# Patient Record
Sex: MALE | Race: WHITE | NOT HISPANIC OR LATINO | Employment: STUDENT | URBAN - METROPOLITAN AREA
[De-identification: names, ages, dates, MRNs, and addresses within clinical notes are randomized per-mention and may not be internally consistent; named-entity substitution may affect disease eponyms.]

---

## 2022-09-06 ENCOUNTER — APPOINTMENT (OUTPATIENT)
Dept: LAB | Facility: CLINIC | Age: 22
End: 2022-09-06
Payer: COMMERCIAL

## 2022-09-06 DIAGNOSIS — Z13.0 ENCOUNTER FOR SICKLE-CELL SCREENING: ICD-10-CM

## 2022-09-06 PROCEDURE — 85660 RBC SICKLE CELL TEST: CPT

## 2022-09-06 PROCEDURE — 36415 COLL VENOUS BLD VENIPUNCTURE: CPT

## 2022-09-07 LAB — SICKLE CELLS BLD QL SMEAR: NEGATIVE

## 2022-09-08 ENCOUNTER — ATHLETIC TRAINING (OUTPATIENT)
Dept: SPORTS MEDICINE | Facility: OTHER | Age: 22
End: 2022-09-08

## 2022-09-08 DIAGNOSIS — M25.551 RIGHT HIP PAIN: Primary | ICD-10-CM

## 2022-09-08 NOTE — PROGRESS NOTES
AT Evaluation    Assessment/Plan     Patient will be referred to [facility] for further evaluation  Subjective     Patient reported to  for R lower back pain  Patient states the pain had started March 2022  Ed Vargas Patient stated throughout the last baseball season, the pain was intermittent, and did not report symptoms to   At this time, patient is experiencing pain while sitting in class/sitting in truck for long periods of time  Pain is described as a sharp pain located along R portion of lower back and into R hip  Patient has no problems walking/standing/laying down on back, but as soon as patient sits down, pain begins again  Patient also reports numbness and tingling down R leg after sitting for long periods of time  Patient describes pain level as 6/10 at worst when sitting  Patient also reports a clicking/popping sensation in R hip at this time  Objective     Upon inspection, Patient had no swelling, edema, or obvious deformities at R lower back and R hip   ROM WNL, but patient experiences pain with hip flexion      (+) hip scour   (-) slump test  (-) SLR & (-) SLR w/ dorsiflexion

## 2022-09-09 ENCOUNTER — APPOINTMENT (OUTPATIENT)
Dept: RADIOLOGY | Facility: OTHER | Age: 22
End: 2022-09-09
Payer: COMMERCIAL

## 2022-09-09 ENCOUNTER — ATHLETIC TRAINING (OUTPATIENT)
Dept: SPORTS MEDICINE | Facility: OTHER | Age: 22
End: 2022-09-09

## 2022-09-09 ENCOUNTER — APPOINTMENT (OUTPATIENT)
Dept: RADIOLOGY | Facility: OTHER | Age: 22
End: 2022-09-09

## 2022-09-09 VITALS
SYSTOLIC BLOOD PRESSURE: 110 MMHG | WEIGHT: 215.6 LBS | HEART RATE: 80 BPM | HEIGHT: 72 IN | BODY MASS INDEX: 29.2 KG/M2 | DIASTOLIC BLOOD PRESSURE: 72 MMHG

## 2022-09-09 DIAGNOSIS — G89.29 CHRONIC RIGHT-SIDED LOW BACK PAIN WITHOUT SCIATICA: Primary | ICD-10-CM

## 2022-09-09 DIAGNOSIS — M54.50 CHRONIC RIGHT-SIDED LOW BACK PAIN WITHOUT SCIATICA: Primary | ICD-10-CM

## 2022-09-09 DIAGNOSIS — M25.551 RIGHT HIP PAIN: ICD-10-CM

## 2022-09-09 DIAGNOSIS — M54.50 CHRONIC RIGHT-SIDED LOW BACK PAIN WITHOUT SCIATICA: ICD-10-CM

## 2022-09-09 DIAGNOSIS — G89.29 CHRONIC RIGHT-SIDED LOW BACK PAIN WITHOUT SCIATICA: ICD-10-CM

## 2022-09-09 PROCEDURE — 99204 OFFICE O/P NEW MOD 45 MIN: CPT | Performed by: ORTHOPAEDIC SURGERY

## 2022-09-09 PROCEDURE — 72100 X-RAY EXAM L-S SPINE 2/3 VWS: CPT

## 2022-09-09 RX ORDER — METHYLPREDNISOLONE 4 MG/1
TABLET ORAL
Qty: 21 EACH | Refills: 0 | Status: SHIPPED | OUTPATIENT
Start: 2022-09-09

## 2022-09-09 NOTE — PROGRESS NOTES
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  He identifies soreness in his low back on both sides, right worse than left, since February of 2022 during his most recent base will season  He states he does not have discomfort when playing baseball but notes pain in his low back when in his car driving home after the game  He states this pain radiates anteriorly and inferiorly towards his waist but no further  He qualifies this discomfort as achy but is sharp with certain movements  He states this discomfort is increasing in frequency  He states he has had several episodes of numbness down the right leg to the mid thigh when leaning on his right glute sitting in the car  He has no issues with running or standing  He has not tried any oral medications for palliation  He endorses some palliation of his symptoms with warm compresses  He endorses easily fatiguing with repetitive hip flexion activities  During this most recent basal seizing, he was evaluated several times by his  who gave him stretches to do at home which helped somewhat  He states that when he finished a baseball season, his pain resolved  Of note, he does endorse occasional clicking in the right hip when pitching  He denies any numbness or tingling radiating down his legs beyond his knee  He denies any weakness in his legs  He denies any saddle anesthesia  He denies any bowel or bladder incontinence  He endorses no personal or family history of cancers      Summary of treatment to-date:  As above  I have personally reviewed pertinent films in PACS and my interpretation is X-rays of the lumbar spine obtained 09/09/2022: Negative for acute osseous abnormality  Normal-appearing disc spaces       There is no problem list on file for this patient  No current outpatient medications on file prior to visit  No current facility-administered medications on file prior to visit  No Known Allergies     Tobacco Use: Low Risk     Smoking Tobacco Use: Never Smoker    Smokeless Tobacco Use: Never Used        Social Determinants of Health     Tobacco Use: Low Risk     Smoking Tobacco Use: Never Smoker    Smokeless Tobacco Use: Never Used   Alcohol Use: Not on file   Financial Resource Strain: Not on file   Food Insecurity: Not on file   Transportation Needs: Not on file   Physical Activity: Not on file   Stress: Not on file   Social Connections: Not on file   Intimate Partner Violence: Not on file   Depression: Not on file   Housing Stability: Not on file               Review of Systems     Body mass index is 29 24 kg/m²  Physical Exam  Vitals and nursing note reviewed  Constitutional:       General: He is not in acute distress  Appearance: Normal appearance  He is normal weight  He is not ill-appearing, toxic-appearing or diaphoretic  HENT:      Head: Normocephalic and atraumatic  Eyes:      General: No scleral icterus  Conjunctiva/sclera: Conjunctivae normal    Cardiovascular:      Pulses: Normal pulses  Pulmonary:      Effort: Pulmonary effort is normal  No respiratory distress  Skin:     General: Skin is warm and dry  Capillary Refill: Capillary refill takes less than 2 seconds  Neurological:      Mental Status: He is alert and oriented to person, place, and time  Ortho Exam:    Body part: Lumbar spine and right hip     Inspection:  No obvious visual abnormalities on inspection  No swelling or erythema      Palpation:  Tender to palpation along the right sciatic notch worsen tenderness elicited on palpation of body of piriformis and greater trochanter  Hypertonicity without tenderness to palpation of bilateral hamstrings  No midline lumbar spine tenderness  No tenderness to palpation of paraspinal musculature  Range of motion:  Able to touch toes for flexion  Intact lumbar spine extension  Intact lumbar spine rotation with stabilized pelvis  Special Tests:  Equivocal Stork test bilaterally  Positive Dee test   Unable to resist forced abduction with abducted right lower extremity indicating weak gluteus medius  Negative MATHEUS  Negative FADIR  Negative straight leg raise test bilaterally  Distal Neurovascular Status: Intact, Yes    Procedures       Assessment:     Diagnosis ICD-10-CM Associated Orders   1  Chronic right-sided low back pain without sciatica  M54 50 XR spine lumbar 2 or 3 views injury    G89 29 Ambulatory referral to Physical Therapy     methylPREDNISolone 4 MG tablet therapy pack   2  Right hip pain  M25 551 Ambulatory Referral to Orthopedic Surgery      AP: Medrol, PT, FU in 7-8 weeks, ok to be active but no rowing or heavy lifts  Plan: We reviewed our current clinical assessment with Princess Singer  We reviewed his imaging, which includes lumbar spine x-ray and reveals no acute osseous abnormality  At this time, his clinical presentation is concerning for L5-S1 radiculopathy, though differential diagnosis includes greater trochanteric pain syndrome, piriformis syndrome, and gluteus medius weakness  We discussed management options, reviewed natural history, and engaged in shared decision-making  At this point, we recommend a Medrol Dosepak for any peripheral nerve inflammation  Referral provided for formal physical therapy with instructions to engage in piriformis, IT band, and core strengthening exercises  Recommend extension sparing exercises with physical therapy and TENS therapy as needed    If no improvement with the above plan and follow-up visit in roughly 7 weeks, will consider lumbar spine MRI  Patient endorsed understanding and acceptance of the above plan  Follow-Up:    Seven weeks        Portions of the record may have been created with voice recognition software  Occasional wrong word or "sound alike" substitutions may have occurred due to the inherent limitations of voice recognition software  Please review the chart carefully and recognize, using context, where substitutions/typographical errors may have occurred

## 2022-09-12 NOTE — PROGRESS NOTES
AT Treatment      Subjective: Patient reported to  for injury check-in/rehabilitation under supervision of ATC  Per Dr Leopold Care, patient may not participate in squatting/deadlifting activities, and PT Referral provided for formal physical therapy with instructions to engage in piriformis, IT band, and core strengthening exercises  Recommend extension sparing exercises with physical therapy and TENS therapy as needed  Objective: See treatment diary below    Manuals 9/9/2022                                                                Neuro Re-Ed             Hamstring Mobilizations (laying) 3x10            IT Band Mobilizations (side-lying w/ foam pad)  3x10                                                                              Ther Ex             Clamshells              Rev  Clamshells             Hip Hikes             Hip Extension w/ TB (undecided color)             Monster Walks (undecided TB)             Lateral Walks (undecided TB)                                       Ther Activity                                       Gait Training                                       Modalities                                           Assessment: Tolerated treatment well  Patient demonstrated fatigue post treatment, exhibited good technique with therapeutic exercises and would benefit from continued AT      Plan: Continue per plan of care

## 2023-04-28 ENCOUNTER — APPOINTMENT (OUTPATIENT)
Dept: RADIOLOGY | Facility: AMBULARY SURGERY CENTER | Age: 23
End: 2023-04-28

## 2023-04-28 VITALS
SYSTOLIC BLOOD PRESSURE: 130 MMHG | BODY MASS INDEX: 29.12 KG/M2 | HEART RATE: 79 BPM | HEIGHT: 72 IN | WEIGHT: 215 LBS | DIASTOLIC BLOOD PRESSURE: 75 MMHG

## 2023-04-28 DIAGNOSIS — G89.29 CHRONIC LEFT SHOULDER PAIN: ICD-10-CM

## 2023-04-28 DIAGNOSIS — M25.512 CHRONIC LEFT SHOULDER PAIN: ICD-10-CM

## 2023-04-28 DIAGNOSIS — G89.29 CHRONIC LEFT SHOULDER PAIN: Primary | ICD-10-CM

## 2023-04-28 DIAGNOSIS — M25.512 CHRONIC LEFT SHOULDER PAIN: Primary | ICD-10-CM

## 2023-04-28 RX ORDER — METHOCARBAMOL 750 MG/1
750 TABLET, FILM COATED ORAL
Qty: 30 TABLET | Refills: 0 | Status: SHIPPED | OUTPATIENT
Start: 2023-04-28

## 2023-04-28 RX ORDER — NAPROXEN 500 MG/1
TABLET ORAL
COMMUNITY
Start: 2023-04-25

## 2023-04-28 NOTE — PATIENT INSTRUCTIONS
Over the next few days, you should do the following: Ice the area for 15 minutes and then remove ice for at least 15 minutes  Try to do this as much as you can throughout the day (at least 4-5 x per day)  Ice serves as an anti-inflammatory and will help with recovery by reducing pain and swelling  Continue with the anti-inflammatory  Relative rest- avoid activities that cause discomfort/pain in that area  You can obtain diclofenac cream/gel/ointment over the counter  Many brands make this medication and they include Voltaren, Aspercreme and Aleve  You can use this up to 3 times per day  It is best to wash the area with water, pat dry and then apply  The cream absorbs better after this  Be careful not to let any pets or children get in contact with the medication as it can be harmful to them  If you develop a skin reaction, stop using the cream      You will be starting physical therapy  It is important to do home exercises as given by your physical therapist as you go through PT to speed up your recovery  Physical therapy addresses the problems that are causing your pain, instead of covering them up, as some other treatment options do

## 2023-04-28 NOTE — PROGRESS NOTES
1  Chronic left shoulder pain  XR shoulder 2+ vw left    Ambulatory referral to Physical Therapy    methocarbamol (ROBAXIN) 750 mg tablet        Orders Placed This Encounter   Procedures   • XR shoulder 2+ vw left   • Ambulatory referral to Physical Therapy     Impression:  Left shoulder pain likely secondary to subacromial impingement and less likely due to labral derangement  We discussed different treatment options and decided to proceed with formal physical therapy  He should hold off on throwing  Continue rehab with athletic trainers  I have prescribed methocarbamol for muscle spasticity and to also aid in sleep  He can continue with prescription strength naproxen  I will see him back in 3 weeks to reassess  Imaging Studies (I personally reviewed images in PACS and report):  Left shoulder x-rays most recent to this encounter reviewed  These images show no acute osseous abnormalities or severe degeneration  The patient's pertinent emergency department/urgent care/referring physician's notes were reviewed  Return in about 3 weeks (around 5/19/2023)  Patient is in agreement with the above plan  HPI:  Kevin Longoria is a 23 y o  male  who presents for evaluation of   Chief Complaint   Patient presents with   • Left Shoulder - Pain     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  He has the pain all day  Stats it feels like it is burning at times  Onset/Mechanism: Started 3-4 weeks ago after throwing  Location: Anterolateral shoulder, deep inside  Radiation: Under the arm pit and the scapula  Provocative: Pitching  Severity: Can be severe  Associated Symptoms: Burning sensation  Treatment so far: PT with [facility] last summer that helped  He has been doing heating, cupping, ice, scrapping  Following history reviewed and updated:  History reviewed  No pertinent past medical history  History reviewed   No pertinent surgical history  Social History   Social History     Substance and Sexual Activity   Alcohol Use None     Social History     Substance and Sexual Activity   Drug Use Not on file     Social History     Tobacco Use   Smoking Status Never   Smokeless Tobacco Never     History reviewed  No pertinent family history  No Known Allergies     Constitutional:  /75   Pulse 79   Ht 6' (1 829 m)   Wt 97 5 kg (215 lb)   BMI 29 16 kg/m²    General: NAD  Eyes: Anicteric sclerae  Neck: Supple  Lungs: Unlabored breathing  Cardiovascular: No lower extremity edema  Skin: Intact without erythema  Neurologic: Sensation intact to light touch  Psychiatric: Mood and affect are appropriate  Left Shoulder Exam     Tenderness   The patient is experiencing tenderness in the acromion and biceps tendon  Range of Motion   The patient has normal left shoulder ROM  Tests   Apprehension: positive  Shane test: positive  Cross arm: negative  Impingement: positive  Drop arm: negative  Sulcus: absent    Other   Erythema: absent  Scars: absent  Sensation: normal  Pulse: present     Comments:  Speed's test is weak and mild discomfort  Apprehension improves with anterior to posterior pressure    Normal Adson's test              Procedures

## 2023-04-28 NOTE — LETTER
To Whom It May Concern,    George Eisenberg is under my professional care  He was seen in my office on April 28, 2023  He can return to sports with the following restriction(s):    • No throwing  • Continue rehab exercises with athletic trainers  • Please excuse George Eisenberg from any classes missed on this appointment date  If you have any questions or concerns, please do not hesitate to call          Sincerely,          Miguel Graham, DO

## 2023-05-02 ENCOUNTER — OFFICE VISIT (OUTPATIENT)
Dept: PHYSICAL THERAPY | Facility: OTHER | Age: 23
End: 2023-05-02

## 2023-05-02 DIAGNOSIS — M25.512 CHRONIC LEFT SHOULDER PAIN: Primary | ICD-10-CM

## 2023-05-02 DIAGNOSIS — G89.29 CHRONIC LEFT SHOULDER PAIN: Primary | ICD-10-CM

## 2023-05-02 NOTE — PROGRESS NOTES
PT Evaluation     Today's date: 2023  Patient name: Valery Simons  : 2000  MRN: [id number]  Referring provider: Sirisha Duarte PT  Dx:   Encounter Diagnosis     ICD-10-CM    1  Chronic left shoulder pain  M25 512     G89 29           Start Time: 1015  Stop Time: 1115  Total time in clinic (min): 60 minutes    Assessment  Assessment details: Patient is a 23 y o  M presenting with complaints of L shoulder pain and weakness that is affecting his ability to return to sport  Patient presents with weakness global weakness of the L shoulder especially in the periscapular muscles  UQS mostly unremarkable, with the exception of diminished C5 dermatome and myotome  On palpation patient had tenderness to the subscapularis and hypotonicity of the periscapular muscles L>R  Patient displayed minimal neural tension in ulnar and median nerves via ULTT  Patient had discomfort with palpation over the L thoracic outlet which was relieved by scalene stretching  Patient had positive L Cervical Flexion Lateral Rotation Test  Negative MAINOR  Overall history and presentation suggests 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 plexus caused by repetitive throwing and overuse  Patient will benefit from skilled physical therapy to address impairments listed above and enable him to return to his sport  Impairments: abnormal coordination, abnormal muscle firing, abnormal muscle tone, activity intolerance, impaired physical strength, pain with function and scapular dyskinesis    Symptom irritability: moderateBarriers to therapy: N/A  Understanding of Dx/Px/POC: excellent   Prognosis: good    Goals  Patient will report decreased pain by 2 points in the next 2 weeks in order to display improvement in condition  Patient will display knowledge of HEP in 2 weeks in order to facilitate recovery quicker  Patient will improve FOTO score by 4 points in 3 wks in order to reach [facility] and display improvement in functional activity     Patient will reach 66 "points on FOTO in order to reach benchmark and display improvement in functional activity   Patient will improve L shoulder strength by half a grade in 6 weeks in order to facilitate return to functional activity    Plan  Patient would benefit from: skilled physical therapy  Referral necessary: No  Planned modality interventions: cryotherapy, electrical stimulation/Russian stimulation, low level laser therapy and TENS  Planned therapy interventions: activity modification, body mechanics training, functional ROM exercises, graded exercise, home exercise program, neuromuscular re-education, motor coordination training, joint mobilization, manual therapy, patient education, postural training, strengthening, stretching, therapeutic activities and therapeutic exercise  Frequency: 2x week  Duration in visits: 12  Duration in weeks: 6  Plan of Care beginning date: 5/2/2023  Plan of Care expiration date: 7/25/2023  Treatment plan discussed with: patient        Subjective Evaluation    History of Present Illness  Date of onset: 4/18/2023  Mechanism of injury: Martita Stephens is a 23 y o  M  at [employer] [employer] presenting to physical therapy 2 weeks ago after his L shoulder felt weak while pitching  Patient reports that he \"threw 5 pitches and suddenly lost all of his velocity\"  He is L hand dominant  He woke up the next morning couldn't lift up his L arm  He also gets pain in his L collarbone, and scapula  Patient describes his pain as a deep ache in his L shoulder Patient reports that he doesn't have any numbness or tingling or neck pain  Patient reports that he had the same issue last year during the spring season and PT over the summer caused his symptoms to completely resolve  Patient reports that his coaches at [employer] [facility] [employer] \"don't have a set program in place for throwing\" and he often finds out the day of when he is going to pitch   Patient reports that he was pitching every 5 days and throwing bullpen in practice almost every " "day before on set of his symptoms  Patient reports that he had X-rays which were negative  He was prescribed muscle relaxers which he has yet to take  Patient reports that he wakes up at night sometimes because his arm has \"fallen asleep\"  AGGS: sitting for a long time, throwing  Relieves: movement stretching,               Recurrent probem    Quality of life: excellent    Pain  Current pain ratin  At best pain ratin  At worst pain ratin  Quality: dull ache and discomfort  Relieving factors: change in position and rest  Aggravating factors: overhead activity and lifting    Hand dominance: left      Diagnostic Tests  X-ray: normal        Objective     Postural Observations  Seated posture: fair  Standing posture: fair  Correction of posture: makes symptoms better    Additional Postural Observation Details  Protracted posture with slight forward head  Symptoms relieved with correction - shoulders down and back upright  Palpation   Left   Hypotonic in the infraspinatus, levator scapulae, middle trapezius and rhomboids  Tenderness     Left Shoulder   Tenderness in the supraspinatus tendon       Neurological Testing     Reflexes   Left   Biceps (C5/C6): absent (0)  Brachioradialis (C6): normal (2+)  Triceps (C7): absent (0)    Right   Biceps (C5/C6): absent (0)  Brachioradialis (C6): normal (2+)  Triceps (C7): absent (0)    Active Range of Motion   Left Shoulder   Normal active range of motion    Right Shoulder   Normal active range of motion    Strength/Myotome Testing     Left Shoulder     Planes of Motion   Abduction: 4-   External rotation at 0°: 4-   Internal rotation at 0°: 5     Isolated Muscles   Biceps: 4-   Lower trapezius: 4-   Middle trapezius: 4-   Rhomboids: 5     Right Shoulder   Normal muscle strength    Planes of Motion   Abduction: 5   External rotation at 0°: 5   Internal rotation at 0°: 5     Isolated Muscles   Biceps: 5   Lower trapezius: 4+   Middle trapezius: 4+   Rhomboids: " "5     Left Wrist/Hand   Wrist extension: 5  Wrist flexion: 5    Right Wrist/Hand   Wrist extension: 5  Wrist flexion: 5    Tests     Left Shoulder   Negative Hawkin's       General Comments:    Upper quarter screen   Elbow: unremarkable  Hand/wrist: unremarkable    Shoulder Comments   Diminished C5 Dermatome    Wrist/Hand Comments   strength R 140, L105 LH Dominant     Precautions: Universal    Daily Treatment Log  Manuals 5/1            Scalene Stretch ZK                                                   Neuro Re-Ed             Chin Tucks  20x5\"            Nerve Glides 30x   Median  Ulnar                                                                             Ther Ex                                                                                                                     Ther Activity                                       Gait Training                                       Modalities                                          "

## 2023-05-04 ENCOUNTER — OFFICE VISIT (OUTPATIENT)
Dept: PHYSICAL THERAPY | Facility: OTHER | Age: 23
End: 2023-05-04

## 2023-05-04 DIAGNOSIS — G89.29 CHRONIC LEFT SHOULDER PAIN: Primary | ICD-10-CM

## 2023-05-04 DIAGNOSIS — M25.512 CHRONIC LEFT SHOULDER PAIN: Primary | ICD-10-CM

## 2023-05-04 NOTE — PROGRESS NOTES
"Daily Note     Today's date: 2023  Patient name: Caleb Fuentes  : 2000  MRN: [id number]  Referring provider: Jasiel Cheng, PT  Dx:   Encounter Diagnosis     ICD-10-CM    1  Chronic left shoulder pain  M25 512     G89 29           Start Time: 0800  Stop Time: 0900  Total time in clinic (min): 60 minutes    Subjective: Patient reports that his shoulder and back feel a little tight today  Objective: See treatment diary below      Assessment: Tolerated treatment well  Began periscapular strengthening  Patient was able to complete all exercises without symptom provocation  Educated on exercises to avoid and and proper form  Patient demonstrated fatigue post treatment, exhibited good technique with therapeutic exercises and would benefit from continued PT      Plan: Continue per plan of care  Daily Treatment Log  Manuals            Scalene Stretch ZK ZK           IASTM  ZK           Cervical glides  L opening  ZK           STM  L U Trap  ZK           Neuro Re-Ed             Chin Tucks  20x5\"            Nerve Glides 30x   Median  Ulnar            Prone Ys, Ts, Is, Blue PBall  3x10 ea 8#           High Rows   3x10 25# Miladis              Ecc Ts  3x15 5# Miladis                                      Ther Ex             ER/IR  3x10 ea [place] 15#           Serratus Punches  3x10 8#                                                                                         Ther Activity                                       Gait Training                                       Modalities                                             "

## 2023-05-09 ENCOUNTER — OFFICE VISIT (OUTPATIENT)
Dept: PHYSICAL THERAPY | Facility: OTHER | Age: 23
End: 2023-05-09

## 2023-05-09 DIAGNOSIS — M25.512 CHRONIC LEFT SHOULDER PAIN: Primary | ICD-10-CM

## 2023-05-09 DIAGNOSIS — G89.29 CHRONIC LEFT SHOULDER PAIN: Primary | ICD-10-CM

## 2023-05-09 NOTE — PROGRESS NOTES
"Daily Note     Today's date: 2023  Patient name: Radha Hernandez  : 2000  MRN: [id number]  Referring provider: Vimal Prado, PT  Dx:   Encounter Diagnosis     ICD-10-CM    1  Chronic left shoulder pain  M25 512     G89 29           Start Time: 0800  Stop Time: 0900  Total time in clinic (min): 60 minutes    Subjective: Patient reports that he has been feeling much better after treatment sessions, he has been able to throw the last couple of days with no pain  Objective: See treatment diary below      Assessment: Tolerated treatment well  Continued strengthening with emphasis on periscapular musculature  PT added several new exercises to improve functional motion and strength  Patient tolerated well without symptom provocation  Patient demonstrated fatigue post treatment, exhibited good technique with therapeutic exercises and would benefit from continued PT      Plan: Continue per plan of care  Daily Treatment Log  Manuals           Scalene Stretch ZK ZK           IASTM  ZK ZK          Cervical glides  L opening  [address]           MREs   [facility]          [address] [place] [address]          [facility]          STM  L U Trap  ZK L U Trap ZK          Neuro Re-Ed            Chin Tucks  20x5\"            Nerve Glides 30x   Median  Ulnar            Prone Ys, Ts, Is, Blue PBall  3x10 ea 8# 3x10 ea 8#          High Rows   3x10 25# [place]    3x10 25# [place]          Ecc Ts  3x15 5# [place]  3x15 5# Miladis          Quadruped pull throughs   10# KB 3x10                       Ther Ex            ER/IR  3x10 ea [place] 15# 3x10ea [place] 15#          Serratus Punches  3x10 8# 3x10 8#                                                                                        Ther Activity                                       Gait Training                                       Modalities             HP   10'                               "

## 2023-05-11 ENCOUNTER — OFFICE VISIT (OUTPATIENT)
Dept: PHYSICAL THERAPY | Facility: OTHER | Age: 23
End: 2023-05-11

## 2023-05-11 DIAGNOSIS — G89.29 CHRONIC LEFT SHOULDER PAIN: Primary | ICD-10-CM

## 2023-05-11 DIAGNOSIS — M25.512 CHRONIC LEFT SHOULDER PAIN: Primary | ICD-10-CM

## 2023-05-11 NOTE — PROGRESS NOTES
"Daily Note     Today's date: 2023  Patient name: Gume Moran  : 2000  MRN: [id number]  Referring provider: Olman Wyman, PT  Dx: No diagnosis found  Start Time: 0800  Stop Time: 0845  Total time in clinic (min): 45 minutes    Subjective: Patient reports that his shoulder is feeling sore after last session    Objective: See treatment diary below      Assessment: Tolerated treatment well  Because of soreness, session focused on stretching and soreness relief  Patient planned on lifting on his own at practice later today  Continue to monitor and progress as able NV  Patient demonstrated fatigue post treatment, exhibited good technique with therapeutic exercises and would benefit from continued PT      Plan: Continue per plan of care  Daily Treatment Log  Manuals          Scalene Stretch ZK ZK           IASTM  ZK ZK ZK         Cervical glides  L opening  [address]           MREs   [place] [address] [facility]          FR   [facility] ZK         STM  L U Trap  ZK L U Trap ZK L U Trap ZK         Neuro Re-Ed            Chin Tucks  20x5\"            Nerve Glides 30x   Median  Ulnar            Prone Ys, Ts, Is, Blue PBall  3x10 ea 8# 3x10 ea 8#          High Rows   3x10 25# [place]    3x10 25# [place]          Ecc Ts  3x15 5# Miladis  3x15 5# Miladis          Quadruped pull throughs   10# KB 3x10          FR Protocol    ZK         Ther Ex           ER/IR  3x10 ea Miladis 15# 3x10ea [place] 15#          Serratus Punches  3x10 8# 3x10 8#          Lat stretch Barbell    20x5\"         Lat stretch active OH    20x5\"         Arm Bike     5'/5 Retro                                                Ther Activity                                       Gait Training                                       Modalities             HP   10'                                 "

## 2023-05-12 ENCOUNTER — OFFICE VISIT (OUTPATIENT)
Dept: OBGYN CLINIC | Facility: CLINIC | Age: 23
End: 2023-05-12

## 2023-05-12 VITALS — DIASTOLIC BLOOD PRESSURE: 81 MMHG | SYSTOLIC BLOOD PRESSURE: 117 MMHG | HEART RATE: 72 BPM

## 2023-05-12 DIAGNOSIS — G89.29 CHRONIC LEFT SHOULDER PAIN: Primary | ICD-10-CM

## 2023-05-12 DIAGNOSIS — M25.512 CHRONIC LEFT SHOULDER PAIN: Primary | ICD-10-CM

## 2023-05-12 NOTE — PROGRESS NOTES
1  Chronic left shoulder pain          No orders of the defined types were placed in this encounter  Impression:  Patient is here in follow up of left shoulder pain likely secondary to subacromial impingement  Treatment has included physical therapy, activity modification, methocarbamol and naproxen  Patient is doing very well  He has minimal impingement on examination today  He has started a throwing program which he should stick to  He would benefit from continued PT and working with his athletic trainers  I will see him back if needed      Imaging Studies (I personally reviewed images in PACS and report):  Left shoulder x-rays most recent to this encounter reviewed  These images show no acute osseous abnormalities or severe degeneration  No follow-ups on file  Patient is in agreement with the above plan  HPI:  Anika Jarrett is a 23 y o  male  who presents in follow up  Here for No chief complaint on file  Since last visit: See above  He is feeling a lot better  He is able to throw with less pain  He does not have pain sleeping on that side  He feels 85% improved  Following history reviewed and updated:  No past medical history on file  No past surgical history on file  Social History   Social History     Substance and Sexual Activity   Alcohol Use None     Social History     Substance and Sexual Activity   Drug Use Not on file     Social History     Tobacco Use   Smoking Status Never   Smokeless Tobacco Never     No family history on file  No Known Allergies     Constitutional:  There were no vitals taken for this visit  General: NAD  Eyes: Clear sclerae  ENT: No inflammation, lesion, or mass of lips  No tracheal deviation  Musculoskeletal: As mentioned below  Integumentary: No visible rashes or skin lesions  Pulmonary/Chest: Effort normal  No respiratory distress  Neuro: CN's grossly intact, WOOD  Psych: Normal affect and judgement  Vascular: WWP      Left Shoulder Exam     Tenderness   The patient is experiencing tenderness in the acromion  Range of Motion   The patient has normal left shoulder ROM  Muscle Strength   The patient has normal left shoulder strength  Tests   Apprehension: negative  Shane test: positive  Cross arm: negative  Impingement: positive  Drop arm: negative  Sulcus: absent    Other   Erythema: absent  Scars: absent  Sensation: normal  Pulse: present     Comments:  Normal speeds test with good strength               Procedures

## 2023-05-16 ENCOUNTER — OFFICE VISIT (OUTPATIENT)
Dept: PHYSICAL THERAPY | Facility: OTHER | Age: 23
End: 2023-05-16

## 2023-05-16 DIAGNOSIS — M25.512 CHRONIC LEFT SHOULDER PAIN: Primary | ICD-10-CM

## 2023-05-16 DIAGNOSIS — G89.29 CHRONIC LEFT SHOULDER PAIN: Primary | ICD-10-CM

## 2023-05-16 NOTE — PROGRESS NOTES
"Daily Note     Today's date: 2023  Patient name: Anika Jarrett  : 2000  MRN: [id number]  Referring provider: Lizabeth Alvarado, PT  Dx:   Encounter Diagnosis     ICD-10-CM    1  Chronic left shoulder pain  M25 512     G89 29       1on1 entire time     Start Time: 0800  Stop Time: 0900  Total time in clinic (min): 60 minutes    Subjective: Patient reports that everything is feeling good, he threw bullpen yesterday without any issues      Objective: See treatment diary below      Assessment: Tolerated treatment well  Added in several new exercises to increase intensity and challenge patient more  Continue to monitor and progress as able NV  Patient demonstrated fatigue post treatment, exhibited good technique with therapeutic exercises and would benefit from continued PT      Plan: Continue per plan of care  Daily Treatment Log  Manuals 5/1 5/4 5/8 5/11 5/15        Scalene Stretch ZK ZK           IASTM  ZK ZK ZK ZK        Cervical glides  L opening  [address]           MREs   [address]          PNF   [facility]          FR   [facility] ZK         STM  L U Trap  ZK L U Trap ZK L U Trap ZK L U Trap   ZK        Neuro Re-Ed  5/4 5/8  5/15        Chin Tucks  20x5\"            Nerve Glides 30x   Median  Ulnar            Prone Ys, Ts, Is, Blue PBall  3x10 ea 8# 3x10 ea 8#          High Rows   3x10 25# [place]    3x10 25# [place]          Ecc Ts  3x15 5# [place]  3x15 5# Miladis  3x15 5# Miladis        Quadruped pull throughs   10# KB 3x10          Row to  [facility] ER     3x10 19# [place]        Renegade Row Plank     2x15 20# 18\" Plyobox        FR Protocol    ZK ZK        Ther Ex           ER/IR  3x10 ea Miladis 15# 3x10ea [place] 15#          Serratus Punches  3x10 8# 3x10 8#          Lat stretch Barbell    20x5\"         Lat stretch active OH    20x5\"         Arm Bike     5'/5 Retro 5'/5' Retro        Supine Kettlebell press                                        Ther Activity                                     " Gait Training                                       Modalities             HP   10'  10'

## 2023-05-18 ENCOUNTER — OFFICE VISIT (OUTPATIENT)
Dept: PHYSICAL THERAPY | Facility: OTHER | Age: 23
End: 2023-05-18

## 2023-05-18 DIAGNOSIS — G89.29 CHRONIC LEFT SHOULDER PAIN: Primary | ICD-10-CM

## 2023-05-18 DIAGNOSIS — M25.512 CHRONIC LEFT SHOULDER PAIN: Primary | ICD-10-CM

## 2023-05-18 NOTE — PROGRESS NOTES
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  Objective: See treatment diary below      Assessment: Tolerated treatment well  Kept session light because he had practice immediately afterwards  Focused on proper form and activation of the periscapular and shoulder musculature to ensure proper mechanics for pitching and throwing at practice  Continue to monitor and progress as able NV  Patient demonstrated fatigue post treatment, exhibited good technique with therapeutic exercises and would benefit from continued PT      Plan: Continue per plan of care  Daily Treatment Log  Manuals 5/1 5/4 5/8 5/11 5/15 5/18       Scalene Stretch ZK ZK           IASTM  ZK ZK ZK ZK ZK       Cervical glides  L opening  [address]           MREs   [address]          PNF   [facility]          FR   [facility] Z         STM  L U Trap  ZK L U Trap ZK L U Trap ZK L U Trap   ZK L U Trap ZK       Neuro Re-Ed  5/4 5/8  5/15 5/18       Chin Tucks  20x5\"            Nerve Glides 30x   Median  Ulnar            Prone Ys, Ts, Is, Blue PBall  3x10 ea 8# 3x10 ea 8#          High Rows   3x10 25# [place]    3x10 25# [place]          Ecc Ts  3x15 5# [place]  3x15 5# Miladis  3x15 5# Miladis        Scap Retractions      3x10 20# [place]       Quadruped pull throughs   10# KB 3x10          Row to  [place] ER     3x10 19# [place] 3x10 20# Miladis       Renegade GWZ OQNNZ     2x15 20# 18\" Plyobox 3x10 25# 18' Plyobox       Oblique Sling Deadbug Chest Press      3x10 20# ea       FR Protocol    ZK ZK ZK       Ther Ex           ER/IR  3x10 ea Miladis 15# Elenaadalgisa Hay " "15#          Serratus Punches  3x10 8# 3x10 8#          Lat stretch Barbell    20x5\"         Lat stretch active OH    20x5\"         Arm Bike     5'/5 Retro 5'/5' Retro 5/5' Retro       Supine Kettlebell press                                        Ther Activity                                       Gait Training                                       Modalities             HP   10'  10'                                   "

## 2023-05-23 ENCOUNTER — APPOINTMENT (OUTPATIENT)
Dept: PHYSICAL THERAPY | Facility: OTHER | Age: 23
End: 2023-05-23
Payer: COMMERCIAL

## 2023-05-25 ENCOUNTER — OFFICE VISIT (OUTPATIENT)
Dept: PHYSICAL THERAPY | Facility: OTHER | Age: 23
End: 2023-05-25

## 2023-05-25 DIAGNOSIS — G89.29 CHRONIC LEFT SHOULDER PAIN: Primary | ICD-10-CM

## 2023-05-25 DIAGNOSIS — M25.512 CHRONIC LEFT SHOULDER PAIN: Primary | ICD-10-CM

## 2023-05-28 NOTE — PROGRESS NOTES
"Daily Note   Today's date: 2023  Patient name: Aleksander Quiroz  : 2000  MRN: [id number]  Referring provider: Sung Joshi, PT  Dx:   Encounter Diagnosis     ICD-10-CM    1  Chronic left shoulder pain  M25 512     G89 29                      Subjective: Patient reports that his shoulder is feeling really good he is hoping to pitch in the playoffs this weekend  Objective: See treatment diary below      Assessment: Tolerated treatment well  Kept session light because he had practice immediately afterwards  Focused on proper form and activation of the periscapular and shoulder musculature to ensure proper mechanics for pitching and throwing at practice  Continue to monitor and progress as able NV  Patient demonstrated fatigue post treatment, exhibited good technique with therapeutic exercises and would benefit from continued PT      Plan: Continue per plan of care  Daily Treatment Log  Manuals 5/1 5/4 5/8 5/11 5/15 5/18       Scalene Stretch ZK ZK           IASTM  ZK ZK ZK ZK ZK       Cervical glides  L opening  [address]           MREs   [facility] Mobs   [facility]          PNF   [facility]          FR   [facility] ZK         STM  L U Trap  ZK L U Trap ZK L U Trap ZK L U Trap   ZK L U Trap ZK       Neuro Re-Ed  5/4 5/8  5/15 5/18       Chin Tucks  20x5\"            Nerve Glides 30x   Median  Ulnar            Prone Ys, Ts, Is, Blue PBall  3x10 ea 8# 3x10 ea 8#          High Rows   3x10 25# [place]    3x10 25# [place]          Ecc Ts  3x15 5# [place]  3x15 5# [place]  3x15 5# Miladis        Scap Retractions      3x10 20# [place]       Quadruped pull throughs   10# KB 3x10          Row to  [facility]     3x10 19# [place] 3x10 20# Miladis       Renegade Row Plank     2x15 20# 18\" Plyobox 3x10 25# 18' Plyobox       Oblique Sling Deadbug Chest Press      3x10 20# ea       FR Protocol    ZK ZK ZK       Ther Ex           ER/IR  3x10 ea Miladis 15# 3x10ea [place] 15#          Serratus Punches  3x10 8# 3x10 8#          Lat stretch " "Alistair    20x5\"         Lat stretch active OH    20x5\"         Arm Bike     5'/5 Retro 5'/5' Retro 5/5' Retro       Supine Kettlebell press                                        Ther Activity                                       Gait Training                                       Modalities             HP   10'  10'                               "

## 2023-07-24 ENCOUNTER — OFFICE VISIT (OUTPATIENT)
Dept: PHYSICAL THERAPY | Facility: OTHER | Age: 23
End: 2023-07-24
Payer: COMMERCIAL

## 2023-07-24 DIAGNOSIS — M25.512 CHRONIC LEFT SHOULDER PAIN: Primary | ICD-10-CM

## 2023-07-24 DIAGNOSIS — G89.29 CHRONIC LEFT SHOULDER PAIN: Primary | ICD-10-CM

## 2023-07-24 PROCEDURE — 97161 PT EVAL LOW COMPLEX 20 MIN: CPT | Performed by: PHYSICAL THERAPIST

## 2023-07-24 PROCEDURE — 97140 MANUAL THERAPY 1/> REGIONS: CPT | Performed by: PHYSICAL THERAPIST

## 2023-07-24 PROCEDURE — 97112 NEUROMUSCULAR REEDUCATION: CPT | Performed by: PHYSICAL THERAPIST

## 2023-07-24 NOTE — PROGRESS NOTES
PT Evaluation   Today's date: 2023  Patient name: Issa Baker  : 2000  MRN: [id number]  Referring provider: Beverley Scott PT  Dx:   Encounter Diagnosis     ICD-10-CM    1. Chronic left shoulder pain  M25.512     G89.29         1 on 1 entire duration  Start Time: 0700  Stop Time: 0745  Total time in clinic (min): 45 minutes    Assessment  Assessment details: Issa Baker is a 26 y.o. male who presents with complaints of chronic left shoulder pain  (primary encounter diagnosis). No further referral appears necessary at this time based upon examination results. Patient presents to PT with impaired strength, impaired ROM, decreased flexibility and impaired ability to complete IADLs. Prognosis is good given HEP compliance and PT 2-3x/wk. Positive prognostic indicators include positive attitude toward recovery. Please contact me if you have any questions or recommendations. Thank you for the opportunity to share in Vini's care. Impairments: abnormal coordination, abnormal muscle firing, abnormal or restricted ROM, activity intolerance, impaired physical strength, lacks appropriate home exercise program, pain with function and poor body mechanics    Symptom irritability: moderateBarriers to therapy: History of L Sh Pain   Understanding of Dx/Px/POC: good   Prognosis: good    Goals  Short Term:  Pt will report decreased levels of pain by at least 2 subjective ratings in 4 weeks  Pt will demonstrate improved ROM by at least 10 degrees in 4 weeks  Pt will demonstrate improved strength by 1/2 grade  Long Term:   Pt will be independent in their HEP in 8 weeks  Pt will be be pain free with IADL's  Pt will demonstrate improved FOTO, > 80         Plan  Plan details: Prognosis above is given PT services 2x/week tapering to 1x/week over the next 3 months and home program adherence.   Patient would benefit from: skilled physical therapy  Planned modality interventions: thermotherapy: hydrocollator packs  Planned therapy interventions: activity modification, joint mobilization, manual therapy, motor coordination training, neuromuscular re-education, patient education, self care, therapeutic activities, therapeutic exercise, graded activity and home exercise program  Frequency: 2x week  Treatment plan discussed with: patient        Subjective Evaluation    History of Present Illness  Mechanism of injury: Patient was here earlier this summer. He told PT that he was doing all of his exercises that he learned while here. However he recently began throwing in preparation for college fall ball and he experienced some pain in the anterior L shoulder. Patient told PT that he just wanted to get it checked out. He said that he feels the pain in his L shoulder when he does not use his whole body to throw. With that being said, PT assessed his lower body and thoracic mobility. He demonstrated impairments in those areas. Pain   L Shoulder  Currently 0/10  [facility] 0/10  [facility] [place] 5/10  Patient described the pain as a pulling sensation in the L Anterior Shoulder.     AGGS: throwing  EASES: rest, ice, stretches   Patient's Goal: "I want to be able to play baseball in college without any pain."          Objective     General Comments:      Shoulder Comments   Whole Body Screen    Hip PROM WNL except IR bilaterally     Strength  Iliopsoas 3-/5 bilaterally   PGM 3-/5 bilaterally   ER 5/5 bilaterally   IR 5/5 bilaterally   Glute Max 4/5 bilaterally   Quads 5/5 bilaterally   Hamstrings 4/5 bilaterally   Adductors 3-/5 bilaterally     Thoracic Mobility limited to the L    Shoulder Screen   Strength   R Shoulder 5/5 throughout unless otherwise noted  Flexion 5/5   Abduction 5/5   Extension 5/5   ER 5/5   IR 5/5   LT 3-/5 bilaterally   MT 3-/5 bilaterally   Rhomboids 5/5   SA L 3-/5 R 5/5     ROM   WNL in all planes bilaterally     Observation  Decreased tone in the lower lumbar MF   Pitching Mechanics will be looked at next PT session     Precautions: Universal     Daily Treatment Log   Manuals 7/24            T/S G5 [address] [facility]            FR [address] [facility]                                      Neuro Re-Ed 7/24            Hip Add Iso Brdg 20x5"            CARs Hip 15x ea            Alt UE/Alt LE 15x5" ea                                                                Ther Ex 7/24            Hip 90/90s 10x10" ea            TS Thread the Needle 10x10" ea            Unlock Hip Stretch #35  10x10" ea                                                                             Ther Activity                                       Gait Training                                       Modalities

## 2023-07-25 ENCOUNTER — OFFICE VISIT (OUTPATIENT)
Dept: PHYSICAL THERAPY | Facility: OTHER | Age: 23
End: 2023-07-25
Payer: COMMERCIAL

## 2023-07-25 DIAGNOSIS — G89.29 CHRONIC LEFT SHOULDER PAIN: Primary | ICD-10-CM

## 2023-07-25 DIAGNOSIS — M25.512 CHRONIC LEFT SHOULDER PAIN: Primary | ICD-10-CM

## 2023-07-25 PROCEDURE — 97110 THERAPEUTIC EXERCISES: CPT | Performed by: PHYSICAL THERAPIST

## 2023-07-25 PROCEDURE — 97140 MANUAL THERAPY 1/> REGIONS: CPT | Performed by: PHYSICAL THERAPIST

## 2023-07-25 PROCEDURE — 97112 NEUROMUSCULAR REEDUCATION: CPT | Performed by: PHYSICAL THERAPIST

## 2023-07-26 NOTE — PROGRESS NOTES
Daily Note   Today's date: 2023  Patient name: Kavita Smoker  : 2000  MRN: [id number]  Referring provider: Marta Brito, PT  Dx:   Encounter Diagnosis     ICD-10-CM    1. Chronic left shoulder pain  M25.512     G89.29         1 on 1 entire duration   Start Time: 07  Stop Time: 0845  Total time in clinic (min): 60 minutes    Subjective:  Patient reports he has been feeling much better since he began rehabbing earlier this week. Patient reports fatigue at the end of today's session. Objective: See treatment diary below    Assessment: Tolerated treatment well. PT added a number of exercises. Patient demonstrated fatigue post treatment, exhibited good technique with therapeutic exercises and would benefit from continued PT    Plan: Continue per plan of care.       Precautions: history of shoulder pain     Daily Treatment Log  Manuals        FR [address] [facility]       T/S G5  [address] [facility]       GISTM  Forearm  [address] [facility]               Neuro Re-Ed         Prone Ys #2 3 x 10        Prone Ts #2 3 x 10        Prone Is #2 3 x 10        Deceleration Throws YMB   3 x 10        PBall Crushers 30 x 5"        PBall Diagonal 20 x 5" ea       Stir the Pot 30x/30x/1x       MF Press K#20 30x ea                       Ther Ex        Resisted SS MTB2x 5x       Monster Walks MTB2x 5x       Hip Add Sliders GTB 3 x 10 ea       Thread the Needle 10 x 10" ea       Sidewinders 30x        T/S Ext 30x                        Ther Activity                        Gait Training                        Modalities

## 2023-07-27 ENCOUNTER — OFFICE VISIT (OUTPATIENT)
Dept: PHYSICAL THERAPY | Facility: OTHER | Age: 23
End: 2023-07-27
Payer: COMMERCIAL

## 2023-07-27 DIAGNOSIS — M25.512 CHRONIC LEFT SHOULDER PAIN: Primary | ICD-10-CM

## 2023-07-27 DIAGNOSIS — G89.29 CHRONIC LEFT SHOULDER PAIN: Primary | ICD-10-CM

## 2023-07-27 PROCEDURE — 97110 THERAPEUTIC EXERCISES: CPT | Performed by: PHYSICAL THERAPIST

## 2023-07-27 PROCEDURE — 97112 NEUROMUSCULAR REEDUCATION: CPT | Performed by: PHYSICAL THERAPIST

## 2023-07-27 PROCEDURE — 97140 MANUAL THERAPY 1/> REGIONS: CPT | Performed by: PHYSICAL THERAPIST

## 2023-07-27 NOTE — PROGRESS NOTES
Daily Note   Today's date: 2023  Patient name: Dimas Mccrary  : 2000  MRN: [id number]  Referring provider: Yareli Parks PT  Dx:   Encounter Diagnosis     ICD-10-CM    1. Chronic left shoulder pain  M25.512     G89.29         1 on 1 entire duration   Start Time: 0700  Stop Time: 0800  Total time in clinic (min): 60 minutes    Subjective:  Patient reports no pain in the L shoulder. He reported increased pain in the bilateral hips. Objective: See treatment diary below    Assessment: Tolerated treatment well. PT focused on shoulder strengthening today as well as flexibility. Patient demonstrated fatigue post treatment, exhibited good technique with therapeutic exercises and would benefit from continued PT    Plan: Continue per plan of care.       Precautions: history of shoulder pain     Daily Treatment Log  Manuals       FR [address] [facility] [address] [facility]      T/S G5  [address] [facility] [address] [facility]      GISTM  Forearm  [address] [facility] Forearm  [address] [facility]              Neuro Re-Ed        Prone Ys  #2 2 x 10 x 5" ea      Prone Ts  #2 2 x 10 x 5" ea      Prone Is  #2 2 x 10 x 5" ea      Robots  2 x 10 x 5"       Quad Rock Back   10 x 10" ea                      Ther Ex        Thread the Needle  10 x 10" ea      Cat Camel  10 x 10"       Sidewinders  20 x 5"                                               Ther Activity                        Gait Training                        Modalities

## 2023-08-09 ENCOUNTER — APPOINTMENT (OUTPATIENT)
Dept: PHYSICAL THERAPY | Facility: OTHER | Age: 23
End: 2023-08-09
Payer: COMMERCIAL

## 2023-08-11 ENCOUNTER — APPOINTMENT (OUTPATIENT)
Dept: PHYSICAL THERAPY | Facility: OTHER | Age: 23
End: 2023-08-11
Payer: COMMERCIAL

## 2023-08-15 ENCOUNTER — APPOINTMENT (OUTPATIENT)
Dept: PHYSICAL THERAPY | Facility: OTHER | Age: 23
End: 2023-08-15
Payer: COMMERCIAL

## 2023-08-15 ENCOUNTER — OFFICE VISIT (OUTPATIENT)
Dept: PHYSICAL THERAPY | Facility: OTHER | Age: 23
End: 2023-08-15
Payer: COMMERCIAL

## 2023-08-15 DIAGNOSIS — G89.29 CHRONIC LEFT SHOULDER PAIN: Primary | ICD-10-CM

## 2023-08-15 DIAGNOSIS — M25.512 CHRONIC LEFT SHOULDER PAIN: Primary | ICD-10-CM

## 2023-08-15 PROCEDURE — 97140 MANUAL THERAPY 1/> REGIONS: CPT | Performed by: PHYSICAL THERAPIST

## 2023-08-15 PROCEDURE — 97112 NEUROMUSCULAR REEDUCATION: CPT | Performed by: PHYSICAL THERAPIST

## 2023-08-17 ENCOUNTER — APPOINTMENT (OUTPATIENT)
Dept: PHYSICAL THERAPY | Facility: OTHER | Age: 23
End: 2023-08-17
Payer: COMMERCIAL

## 2023-08-19 NOTE — PROGRESS NOTES
Daily Note   Today's date: 2023  Patient name: Edward Remy  : 2000  MRN: [id number]  Referring provider: Verónica Pulido, PT  Dx:   Encounter Diagnosis     ICD-10-CM    1. Chronic left shoulder pain  M25.512     G89.29         1 on 1 entire duration              Subjective:  Patient reports no pain in the L shoulder. He reported increased pain in the bilateral hips. Objective: See treatment diary below    Assessment: Tolerated treatment well. PT focused on shoulder strengthening today as well as flexibility. Patient demonstrated fatigue post treatment, exhibited good technique with therapeutic exercises and would benefit from continued PT    Plan: Continue per plan of care.       Precautions: history of shoulder pain     Daily Treatment Log  Manuals       FR [address] [facility] [address] [facility]      T/S G5  [address] [facility] [address] [facility]      GISTM  Forearm  [address] [facility] Forearm  [address] [facility]              Neuro Re-Ed        Prone Ys  #2 2 x 10 x 5" ea      Prone Ts  #2 2 x 10 x 5" ea      Prone Is  #2 2 x 10 x 5" ea      Robots  2 x 10 x 5"       Quad Rock Back   10 x 10" ea                      Ther Ex        Thread the Needle  10 x 10" ea      Cat Camel  10 x 10"       Sidewinders  20 x 5"                                               Ther Activity                        Gait Training                        Modalities flexibility and mobility as well as ability to complete IADLs. Prognosis is good given HEP compliance. Please contact me if you have any questions or recommendations. Thank you for the opportunity to share in Vini's care. Impairments: abnormal coordination, abnormal muscle firing, abnormal or restricted ROM, activity intolerance, impaired physical strength, lacks appropriate home exercise program, pain with function and poor body mechanics    Symptom irritability: moderateBarriers to therapy: History of L Sh Pain   Understanding of Dx/Px/POC: good   Prognosis: good    Goals  Short Term:  Pt will report decreased levels of pain by at least 2 subjective ratings in 4 weeks- MET  Pt will demonstrate improved ROM by at least 10 degrees in 4 weeks- MET  Pt will demonstrate improved strength by 1/2 grade- MET  Long Term:   Pt will be independent in their HEP in 8 weeks- MET  Pt will be be pain free with IADL's- MET  Pt will demonstrate improved FOTO, > 80- MET     Plan  Plan details: Discharged from PT at this time; continue with rehab at school  Patient would benefit from: adherence to HEP  Planned modality interventions: thermotherapy: hydrocollator packs  Planned therapy interventions: home exercise program  Frequency: N/A   Treatment plan discussed with: patient        Subjective Evaluation    History of Present Illness  Mechanism of injury: Patient was here earlier this summer. He told PT that he was doing all of his exercises that he learned while here. However he recently began throwing in preparation for college fall ball and he experienced some pain in the anterior L shoulder. Patient told PT that he just wanted to get it checked out. He said that he feels the pain in his L shoulder when he does not use his whole body to throw. With that being said, PT assessed his lower body and thoracic mobility. He demonstrated impairments in those areas. @RE/DC Patient reports he is feeling much better. He denies any pain and is excited to get to school and start pitching. Today is his last session. He told PT that this is the best he has felt in a while. Pain   L Shoulder  Currently 0/10  [facility] 0/10  At [facility] [place] 2/10  Patient denies pain at this time. However he gets sore occasionally after throwing.      AGGS: throwing  EASES: rest, ice, stretches   Patient's Goal: "I want to be able to play baseball in college without any pain."          Objective     General Comments:      Shoulder Comments   Lower Body Screen    Hip PROM WNL    Strength  Iliopsoas 4/5 bilaterally   PGM 4/5 bilaterally   ER 5/5 bilaterally   IR 5/5 bilaterally   Glute Max 5/5 bilaterally   Quads 5/5 bilaterally   Hamstrings 5/5 bilaterally   Adductors 5/5 bilaterally     Thoracic Mobility limited to the L    Shoulder Screen   Strength   R Shoulder 5/5 throughout unless otherwise noted  Flexion 5/5   Abduction 5/5   Extension 5/5   ER 5/5   IR 5/5   LT 4/5 bilaterally   MT 4/5 bilaterally   Rhomboids 5/5   SA L 5/5 R 5/5     ROM   WNL in all planes bilaterally     Observation  Increased tone in the lower lumbar MF   Pitching Mechanics improved from IE with more thoracic rotation and ROM noted

## 2023-08-22 ENCOUNTER — APPOINTMENT (OUTPATIENT)
Dept: PHYSICAL THERAPY | Facility: OTHER | Age: 23
End: 2023-08-22
Payer: COMMERCIAL

## 2023-08-24 ENCOUNTER — APPOINTMENT (OUTPATIENT)
Dept: PHYSICAL THERAPY | Facility: OTHER | Age: 23
End: 2023-08-24
Payer: COMMERCIAL

## 2023-08-29 ENCOUNTER — APPOINTMENT (OUTPATIENT)
Dept: PHYSICAL THERAPY | Facility: OTHER | Age: 23
End: 2023-08-29
Payer: COMMERCIAL

## 2023-08-31 ENCOUNTER — APPOINTMENT (OUTPATIENT)
Dept: PHYSICAL THERAPY | Facility: OTHER | Age: 23
End: 2023-08-31
Payer: COMMERCIAL